# Patient Record
Sex: FEMALE | Race: WHITE | NOT HISPANIC OR LATINO | Employment: UNEMPLOYED | URBAN - METROPOLITAN AREA
[De-identification: names, ages, dates, MRNs, and addresses within clinical notes are randomized per-mention and may not be internally consistent; named-entity substitution may affect disease eponyms.]

---

## 2017-06-25 ENCOUNTER — OFFICE VISIT (OUTPATIENT)
Dept: URGENT CARE | Age: 82
End: 2017-06-25
Payer: MEDICARE

## 2017-06-25 ENCOUNTER — TRANSCRIBE ORDERS (OUTPATIENT)
Dept: URGENT CARE | Age: 82
End: 2017-06-25

## 2017-06-25 ENCOUNTER — APPOINTMENT (OUTPATIENT)
Dept: LAB | Age: 82
End: 2017-06-25
Payer: MEDICARE

## 2017-06-25 DIAGNOSIS — R35.0 FREQUENCY OF MICTURITION: ICD-10-CM

## 2017-06-25 PROCEDURE — G0463 HOSPITAL OUTPT CLINIC VISIT: HCPCS | Performed by: FAMILY MEDICINE

## 2017-06-25 PROCEDURE — 81002 URINALYSIS NONAUTO W/O SCOPE: CPT | Performed by: FAMILY MEDICINE

## 2017-06-25 PROCEDURE — 87077 CULTURE AEROBIC IDENTIFY: CPT

## 2017-06-25 PROCEDURE — 99203 OFFICE O/P NEW LOW 30 MIN: CPT | Performed by: FAMILY MEDICINE

## 2017-06-25 PROCEDURE — 87086 URINE CULTURE/COLONY COUNT: CPT

## 2017-06-25 PROCEDURE — 87186 SC STD MICRODIL/AGAR DIL: CPT

## 2017-06-27 LAB — BACTERIA UR CULT: NORMAL

## 2018-01-18 NOTE — PROGRESS NOTES
Assessment    1  Urinary tract infection (599 0) (N39 0)    Plan  Urinary frequency    · (1) URINE CULTURE; Source:Urine, Clean Catch; Status:Active - Retrospective  Authorization; Requested [id number];    · Urine Dip Non-Automated- POC; Status:Complete;   Done: [id number] 08:41AM  Urinary tract infection    · Sulfamethoxazole-Trimethoprim 800-160 MG Oral Tablet (Bactrim DS); TAKE 1  TABLET TWICE DAILY WITH FOOD    Discussion/Summary  Discussion Summary:   Take abx as prescribed  drink plenty of fluids  follow up with pp  Chief Complaint    1  Urinary Frequency  Chief Complaint Free Text Note Form: c/o of urinary frequency, burning sensation and pressure since Friday she thinks she may have a UTI  Last UTI was many years ago      History of Present Illness  HPI: [de-identified] F p/w urinary frequency and dysuria x 3 days  Pt denies back pain, fever, chills, abdominal pain, fatigue, malaise, weakness  Urinary Frequency: Kayy Fraser presents with complaints of urinary frequency  Associated symptoms include dysuria and cloudy urine, but no urinary urgency, no urinary incontinence, no nocturia, no hematuria, no fever, no chills, no suprapubic pain, no low back pain, no flank pain, no nausea, no vomiting, no diarrhea, no constipation, no abdominal distention, no peripheral edema, no menstrual change, no vaginal discharge, no vaginal itching, no weight loss and no anxiety  Review of Systems  Focused-Female:   Constitutional: No fever, no chills, feels well, no tiredness, no recent weight gain or loss  ENT: no ear ache, no loss of hearing, no nosebleeds or nasal discharge, no sore throat or hoarseness  Cardiovascular: no complaints of slow or fast heart rate, no chest pain, no palpitations, no leg claudication or lower extremity edema  Respiratory: no complaints of shortness of breath, no wheezing, no dyspnea on exertion, no orthopnea or PND     Breasts: no complaints of breast pain, breast lump or nipple discharge  Gastrointestinal: no complaints of abdominal pain, no constipation, no nausea or diarrhea, no vomiting, no bloody stools  Genitourinary: dysuria, but no pelvic pain, no vaginal discharge, no incontinence, no dysmenorrhea and no unexplained vaginal bleeding  Musculoskeletal: no complaints of arthralgia, no myalgia, no joint swelling or stiffness, no limb pain or swelling  Integumentary: no complaints of skin rash or lesion, no itching or dry skin, no skin wounds  Neurological: no complaints of headache, no confusion, no numbness or tingling, no dizziness or fainting  ROS Reviewed:   ROS reviewed  Active Problems    1  Urinary frequency (788 41) (R35 0)    Past Medical History  Active Problems And Past Medical History Reviewed: The active problems and past medical history were reviewed and updated today  Family History  Family History Reviewed: The family history was reviewed and updated today  Social History  Social History Reviewed: The social history was reviewed and is unchanged  Surgical History  Surgical History Reviewed: The surgical history was reviewed and updated today  Current Meds   1  AmLODIPine Besylate 5 MG Oral Tablet; Therapy: 85MLK0043 to (Last Rx:46Rnb1638)  Requested for: 20KZE6229 Ordered   2  Evista 60 MG Oral Tablet; one po daily; Therapy: 75ASO6214 to (Last QV:37EBK4331)  Requested for: 59Rle1214 Ordered   3  Travatan Z 0 004 % Ophthalmic Solution; Therapy: 31HBP9593 to (Last Rx:11Oct2010)  Requested for: 88CIM7851 Ordered   4  Triamterene-HCTZ 37 5-25 MG Oral Capsule; Therapy: 62NNJ8227 to (Last Rx:71Ati0140)  Requested for: 67DTY1667 Ordered   5  Triamterene-HCTZ 37 5-25 MG Oral Tablet; Therapy: 00VST6686 to (Last Rx:11Ayk1336)  Requested for: 00NOJ9942 Ordered  Medication List Reviewed: The medication list was reviewed and updated today  Allergies    1   No Known Drug Allergies    Vitals  Signs   Recorded: 07QGQ6728 08: 36AM   Temperature: 98 8 F  Heart Rate: 76  Respiration: 20  Systolic: 540  Diastolic: 70  O2 Saturation: 98  Pain Scale: 2    Physical Exam    Constitutional   General appearance: No acute distress, well appearing and well nourished  Eyes   Conjunctiva and lids: No swelling, erythema or discharge  Pupils and irises: Equal, round and reactive to light  Ears, Nose, Mouth, and Throat   External inspection of ears and nose: Normal     Pulmonary   Respiratory effort: No increased work of breathing or signs of respiratory distress  Auscultation of lungs: Clear to auscultation  no rales or crackles were heard bilaterally  no rhonchi  no friction rub  no wheezing  Cardiovascular   Palpation of heart: Normal PMI, no thrills  Auscultation of heart: Normal rate and rhythm, normal S1 and S2, without murmurs  The heart rate was normal  The rhythm was regular  no murmurs were heard  Examination of extremities for edema and/or varicosities: Normal     Abdomen   Abdomen: Non-tender, no masses  The abdomen was flat  The abdomen was soft and nontender  no masses palpated  The abdomen was normal to percussion  Liver and spleen: No hepatomegaly or splenomegaly  Lymphatic   Palpation of lymph nodes in neck: No lymphadenopathy  Skin   Skin and subcutaneous tissue: Normal without rashes or lesions      Psychiatric   Orientation to person, place, and time: Normal     Mood and affect: Normal        Results/Data  Urine Dip Non-Automated- POC 33IWK3318 08:41AM Ruddy Villarreal     Test Name Result Flag Reference   Color Yellow     Clarity Cloudy     Leukocytes large     Nitrite neg     Blood large     Bilirubin neg     Urobilinogen 0 2     Protein neg     Ph 5 0     Specific Gravity 1 010     Ketone neg     Glucose neg     Color Yellow     Clarity Cloudy     Leukocytes large     Nitrite neg     Blood large     Bilirubin neg     Urobilinogen 0 2     Protein neg     Ph 5 0     Specific Gravity 1 010     Ketone neg Glucose neg               Signatures   Electronically signed by : PRASHANT Roa; Jun 25 2017  8:52AM EST                       (Author)    Electronically signed by : DANA Beasley ; Jun 25 2017  9:32AM EST

## 2019-10-02 PROBLEM — K62.5 BRIGHT RED RECTAL BLEEDING: Status: ACTIVE | Noted: 2019-10-02

## 2019-10-02 PROBLEM — K64.9 HEMORRHOIDS: Status: ACTIVE | Noted: 2019-10-02

## 2021-02-12 DIAGNOSIS — Z23 ENCOUNTER FOR IMMUNIZATION: ICD-10-CM

## 2021-03-26 ENCOUNTER — PREPPED CHART (OUTPATIENT)
Dept: URBAN - METROPOLITAN AREA CLINIC 6 | Facility: CLINIC | Age: 86
End: 2021-03-26

## 2021-12-14 ENCOUNTER — 6 MONTH FOLLOW UP (OUTPATIENT)
Dept: URBAN - METROPOLITAN AREA CLINIC 6 | Facility: CLINIC | Age: 86
End: 2021-12-14

## 2021-12-14 DIAGNOSIS — Z96.1: ICD-10-CM

## 2021-12-14 DIAGNOSIS — H40.1131: ICD-10-CM

## 2021-12-14 PROCEDURE — G8427 DOCREV CUR MEDS BY ELIG CLIN: HCPCS

## 2021-12-14 PROCEDURE — 92133 CPTRZD OPH DX IMG PST SGM ON: CPT

## 2021-12-14 PROCEDURE — 92014 COMPRE OPH EXAM EST PT 1/>: CPT

## 2021-12-14 PROCEDURE — 1036F TOBACCO NON-USER: CPT

## 2021-12-14 ASSESSMENT — VISUAL ACUITY
OS_CC: 20/30+1
OD_CC: 20/25

## 2021-12-14 ASSESSMENT — TONOMETRY
OD_IOP_MMHG: 18
OS_IOP_MMHG: 17
OS_IOP_MMHG: 20
OD_IOP_MMHG: 20

## 2022-07-08 ENCOUNTER — 6 MONTH FOLLOW UP (OUTPATIENT)
Dept: URBAN - METROPOLITAN AREA CLINIC 6 | Facility: CLINIC | Age: 87
End: 2022-07-08

## 2022-07-08 DIAGNOSIS — Z96.1: ICD-10-CM

## 2022-07-08 DIAGNOSIS — H40.1131: ICD-10-CM

## 2022-07-08 PROCEDURE — 92015 DETERMINE REFRACTIVE STATE: CPT

## 2022-07-08 PROCEDURE — 92083 EXTENDED VISUAL FIELD XM: CPT

## 2022-07-08 PROCEDURE — 92012 INTRM OPH EXAM EST PATIENT: CPT

## 2022-07-08 ASSESSMENT — TONOMETRY
OD_IOP_MMHG: 17
OS_IOP_MMHG: 15

## 2022-07-08 ASSESSMENT — VISUAL ACUITY
OS_CC: 20/30+1
OD_CC: 20/30+1

## 2023-01-06 ENCOUNTER — ESTABLISHED COMPREHENSIVE EXAM (OUTPATIENT)
Dept: URBAN - METROPOLITAN AREA CLINIC 6 | Facility: CLINIC | Age: 88
End: 2023-01-06

## 2023-01-06 DIAGNOSIS — Z96.1: ICD-10-CM

## 2023-01-06 DIAGNOSIS — H40.1131: ICD-10-CM

## 2023-01-06 PROCEDURE — 92014 COMPRE OPH EXAM EST PT 1/>: CPT

## 2023-01-06 PROCEDURE — 92133 CPTRZD OPH DX IMG PST SGM ON: CPT

## 2023-01-06 ASSESSMENT — VISUAL ACUITY
OS_CC: 20/25-1
OD_CC: 20/25-1

## 2023-01-06 ASSESSMENT — TONOMETRY
OD_IOP_MMHG: 22
OS_IOP_MMHG: 21

## 2023-07-14 ENCOUNTER — IOP CHECK (OUTPATIENT)
Dept: URBAN - METROPOLITAN AREA CLINIC 6 | Facility: CLINIC | Age: 88
End: 2023-07-14

## 2023-07-14 DIAGNOSIS — Z96.1: ICD-10-CM

## 2023-07-14 DIAGNOSIS — H40.053: ICD-10-CM

## 2023-07-14 PROCEDURE — 92012 INTRM OPH EXAM EST PATIENT: CPT

## 2023-07-14 PROCEDURE — 92020 GONIOSCOPY: CPT

## 2023-07-14 PROCEDURE — 92083 EXTENDED VISUAL FIELD XM: CPT

## 2023-07-14 ASSESSMENT — TONOMETRY
OD_IOP_MMHG: 19
OS_IOP_MMHG: 19
OD_IOP_MMHG: 19
OS_IOP_MMHG: 19

## 2023-07-14 ASSESSMENT — VISUAL ACUITY
OD_CC: 20/25-1
OS_CC: 20/25-2

## 2024-02-20 ENCOUNTER — ESTABLISHED COMPREHENSIVE EXAM (OUTPATIENT)
Dept: URBAN - METROPOLITAN AREA CLINIC 6 | Facility: CLINIC | Age: 89
End: 2024-02-20

## 2024-02-20 DIAGNOSIS — Z96.1: ICD-10-CM

## 2024-02-20 DIAGNOSIS — H43.811: ICD-10-CM

## 2024-02-20 DIAGNOSIS — H40.053: ICD-10-CM

## 2024-02-20 PROCEDURE — 92133 CPTRZD OPH DX IMG PST SGM ON: CPT

## 2024-02-20 PROCEDURE — 92014 COMPRE OPH EXAM EST PT 1/>: CPT

## 2024-02-20 ASSESSMENT — VISUAL ACUITY
OS_CC: 20/25-2
OD_CC: 20/30-2

## 2024-02-20 ASSESSMENT — TONOMETRY
OS_IOP_MMHG: 17
OD_IOP_MMHG: 17

## 2024-08-09 ENCOUNTER — IOP CHECK (OUTPATIENT)
Dept: URBAN - METROPOLITAN AREA CLINIC 6 | Facility: CLINIC | Age: 89
End: 2024-08-09

## 2024-08-09 DIAGNOSIS — H40.053: ICD-10-CM

## 2024-08-09 DIAGNOSIS — Z96.1: ICD-10-CM

## 2024-08-09 DIAGNOSIS — H04.123: ICD-10-CM

## 2024-08-09 PROCEDURE — 92012 INTRM OPH EXAM EST PATIENT: CPT

## 2024-08-09 PROCEDURE — 92083 EXTENDED VISUAL FIELD XM: CPT

## 2024-08-09 ASSESSMENT — VISUAL ACUITY
OS_CC: 20/25
OD_CC: 20/25

## 2024-08-09 ASSESSMENT — TONOMETRY
OS_IOP_MMHG: 20
OD_IOP_MMHG: 17
OS_IOP_MMHG: 18
OD_IOP_MMHG: 18

## 2025-02-12 ENCOUNTER — ESTABLISHED COMPREHENSIVE EXAM (OUTPATIENT)
Dept: URBAN - METROPOLITAN AREA CLINIC 6 | Facility: CLINIC | Age: 89
End: 2025-02-12

## 2025-02-12 DIAGNOSIS — H52.13: ICD-10-CM

## 2025-02-12 DIAGNOSIS — Z96.1: ICD-10-CM

## 2025-02-12 DIAGNOSIS — H04.123: ICD-10-CM

## 2025-02-12 DIAGNOSIS — H40.053: ICD-10-CM

## 2025-02-12 DIAGNOSIS — H43.811: ICD-10-CM

## 2025-02-12 PROCEDURE — 92015 DETERMINE REFRACTIVE STATE: CPT

## 2025-02-12 PROCEDURE — 92133 CPTRZD OPH DX IMG PST SGM ON: CPT

## 2025-02-12 PROCEDURE — 92014 COMPRE OPH EXAM EST PT 1/>: CPT

## 2025-02-12 ASSESSMENT — VISUAL ACUITY
OD_CC: 20/25
OU_CC: J2
OS_CC: 20/20-1

## 2025-02-12 ASSESSMENT — TONOMETRY
OD_IOP_MMHG: 18
OS_IOP_MMHG: 18

## 2025-06-05 ENCOUNTER — FOLLOW UP (OUTPATIENT)
Dept: URBAN - METROPOLITAN AREA CLINIC 6 | Facility: CLINIC | Age: 89
End: 2025-06-05

## 2025-06-05 DIAGNOSIS — B02.33: ICD-10-CM

## 2025-06-05 DIAGNOSIS — H40.053: ICD-10-CM

## 2025-06-05 DIAGNOSIS — H04.123: ICD-10-CM

## 2025-06-05 PROCEDURE — 99214 OFFICE O/P EST MOD 30 MIN: CPT

## 2025-06-05 ASSESSMENT — TONOMETRY
OS_IOP_MMHG: 23
OD_IOP_MMHG: 20

## 2025-06-05 ASSESSMENT — VISUAL ACUITY
OS_CC: 20/50-1
OD_CC: 20/30

## 2025-07-10 ENCOUNTER — FOLLOW UP (OUTPATIENT)
Dept: URBAN - METROPOLITAN AREA CLINIC 6 | Facility: CLINIC | Age: 89
End: 2025-07-10

## 2025-07-10 DIAGNOSIS — B02.33: ICD-10-CM

## 2025-07-10 DIAGNOSIS — H04.123: ICD-10-CM

## 2025-07-10 DIAGNOSIS — H40.053: ICD-10-CM

## 2025-07-10 PROCEDURE — 92012 INTRM OPH EXAM EST PATIENT: CPT

## 2025-07-10 ASSESSMENT — TONOMETRY
OD_IOP_MMHG: 14
OS_IOP_MMHG: 16

## 2025-07-10 ASSESSMENT — VISUAL ACUITY
OS_CC: 20/60
OD_CC: 20/25

## (undated) RX ORDER — PREDNISOLONE ACETATE 10 MG/ML: 1 SUSPENSION/ DROPS OPHTHALMIC